# Patient Record
Sex: MALE | Race: WHITE | Employment: FULL TIME | URBAN - METROPOLITAN AREA
[De-identification: names, ages, dates, MRNs, and addresses within clinical notes are randomized per-mention and may not be internally consistent; named-entity substitution may affect disease eponyms.]

---

## 2020-02-19 ENCOUNTER — APPOINTMENT (OUTPATIENT)
Dept: GENERAL RADIOLOGY | Facility: HOSPITAL | Age: 60
End: 2020-02-19
Attending: NURSE PRACTITIONER
Payer: COMMERCIAL

## 2020-02-19 ENCOUNTER — HOSPITAL ENCOUNTER (EMERGENCY)
Facility: HOSPITAL | Age: 60
Discharge: HOME | End: 2020-02-19
Attending: EMERGENCY MEDICINE
Payer: COMMERCIAL

## 2020-02-19 ENCOUNTER — APPOINTMENT (OUTPATIENT)
Dept: ULTRASOUND IMAGING | Facility: HOSPITAL | Age: 60
End: 2020-02-19
Attending: NURSE PRACTITIONER
Payer: COMMERCIAL

## 2020-02-19 VITALS
HEART RATE: 72 BPM | SYSTOLIC BLOOD PRESSURE: 110 MMHG | WEIGHT: 300 LBS | OXYGEN SATURATION: 95 % | HEIGHT: 69 IN | DIASTOLIC BLOOD PRESSURE: 64 MMHG | RESPIRATION RATE: 16 BRPM | TEMPERATURE: 98 F | BODY MASS INDEX: 44.43 KG/M2

## 2020-02-19 DIAGNOSIS — L97.529 ULCER OF LEFT FOOT, UNSPECIFIED ULCER STAGE (HCC): Primary | ICD-10-CM

## 2020-02-19 DIAGNOSIS — R06.02 EXERTIONAL SHORTNESS OF BREATH: ICD-10-CM

## 2020-02-19 LAB
ALBUMIN SERPL-MCNC: 2.7 G/DL (ref 3.4–5)
ALBUMIN/GLOB SERPL: 0.7 {RATIO} (ref 1–2)
ALP LIVER SERPL-CCNC: 96 U/L (ref 45–117)
ALT SERPL-CCNC: 22 U/L (ref 16–61)
ANION GAP SERPL CALC-SCNC: 7 MMOL/L (ref 0–18)
AST SERPL-CCNC: 27 U/L (ref 15–37)
BASOPHILS # BLD AUTO: 0.05 X10(3) UL (ref 0–0.2)
BASOPHILS NFR BLD AUTO: 0.7 %
BILIRUB SERPL-MCNC: 0.5 MG/DL (ref 0.1–2)
BUN BLD-MCNC: 21 MG/DL (ref 7–18)
BUN/CREAT SERPL: 29.2 (ref 10–20)
CALCIUM BLD-MCNC: 8 MG/DL (ref 8.5–10.1)
CHLORIDE SERPL-SCNC: 101 MMOL/L (ref 98–112)
CO2 SERPL-SCNC: 27 MMOL/L (ref 21–32)
CREAT BLD-MCNC: 0.72 MG/DL (ref 0.7–1.3)
DEPRECATED RDW RBC AUTO: 46.2 FL (ref 35.1–46.3)
EOSINOPHIL # BLD AUTO: 0.14 X10(3) UL (ref 0–0.7)
EOSINOPHIL NFR BLD AUTO: 1.9 %
ERYTHROCYTE [DISTWIDTH] IN BLOOD BY AUTOMATED COUNT: 13.5 % (ref 11–15)
GLOBULIN PLAS-MCNC: 4.1 G/DL (ref 2.8–4.4)
GLUCOSE BLD-MCNC: 93 MG/DL (ref 70–99)
HCT VFR BLD AUTO: 42.3 % (ref 39–53)
HGB BLD-MCNC: 13.6 G/DL (ref 13–17.5)
IMM GRANULOCYTES # BLD AUTO: 0.03 X10(3) UL (ref 0–1)
IMM GRANULOCYTES NFR BLD: 0.4 %
LYMPHOCYTES # BLD AUTO: 1.07 X10(3) UL (ref 1–4)
LYMPHOCYTES NFR BLD AUTO: 14.6 %
M PROTEIN MFR SERPL ELPH: 6.8 G/DL (ref 6.4–8.2)
MCH RBC QN AUTO: 29.4 PG (ref 26–34)
MCHC RBC AUTO-ENTMCNC: 32.2 G/DL (ref 31–37)
MCV RBC AUTO: 91.6 FL (ref 80–100)
MONOCYTES # BLD AUTO: 1.37 X10(3) UL (ref 0.1–1)
MONOCYTES NFR BLD AUTO: 18.7 %
NEUTROPHILS # BLD AUTO: 4.67 X10 (3) UL (ref 1.5–7.7)
NEUTROPHILS # BLD AUTO: 4.67 X10(3) UL (ref 1.5–7.7)
NEUTROPHILS NFR BLD AUTO: 63.7 %
NT-PROBNP SERPL-MCNC: 1137 PG/ML (ref ?–125)
OSMOLALITY SERPL CALC.SUM OF ELEC: 283 MOSM/KG (ref 275–295)
PLATELET # BLD AUTO: 196 10(3)UL (ref 150–450)
POTASSIUM SERPL-SCNC: 4.1 MMOL/L (ref 3.5–5.1)
RBC # BLD AUTO: 4.62 X10(6)UL (ref 4.3–5.7)
SODIUM SERPL-SCNC: 135 MMOL/L (ref 136–145)
TROPONIN I SERPL-MCNC: <0.045 NG/ML (ref ?–0.04)
WBC # BLD AUTO: 7.3 X10(3) UL (ref 4–11)

## 2020-02-19 PROCEDURE — 93005 ELECTROCARDIOGRAM TRACING: CPT

## 2020-02-19 PROCEDURE — 73630 X-RAY EXAM OF FOOT: CPT | Performed by: NURSE PRACTITIONER

## 2020-02-19 PROCEDURE — 87186 SC STD MICRODIL/AGAR DIL: CPT | Performed by: NURSE PRACTITIONER

## 2020-02-19 PROCEDURE — 93010 ELECTROCARDIOGRAM REPORT: CPT

## 2020-02-19 PROCEDURE — 96365 THER/PROPH/DIAG IV INF INIT: CPT

## 2020-02-19 PROCEDURE — 83880 ASSAY OF NATRIURETIC PEPTIDE: CPT | Performed by: NURSE PRACTITIONER

## 2020-02-19 PROCEDURE — 36415 COLL VENOUS BLD VENIPUNCTURE: CPT

## 2020-02-19 PROCEDURE — 87040 BLOOD CULTURE FOR BACTERIA: CPT | Performed by: NURSE PRACTITIONER

## 2020-02-19 PROCEDURE — 87205 SMEAR GRAM STAIN: CPT | Performed by: NURSE PRACTITIONER

## 2020-02-19 PROCEDURE — 84484 ASSAY OF TROPONIN QUANT: CPT | Performed by: NURSE PRACTITIONER

## 2020-02-19 PROCEDURE — 71045 X-RAY EXAM CHEST 1 VIEW: CPT | Performed by: NURSE PRACTITIONER

## 2020-02-19 PROCEDURE — 96366 THER/PROPH/DIAG IV INF ADDON: CPT

## 2020-02-19 PROCEDURE — 93970 EXTREMITY STUDY: CPT | Performed by: NURSE PRACTITIONER

## 2020-02-19 PROCEDURE — 99285 EMERGENCY DEPT VISIT HI MDM: CPT

## 2020-02-19 PROCEDURE — 87077 CULTURE AEROBIC IDENTIFY: CPT | Performed by: NURSE PRACTITIONER

## 2020-02-19 PROCEDURE — 85025 COMPLETE CBC W/AUTO DIFF WBC: CPT | Performed by: NURSE PRACTITIONER

## 2020-02-19 PROCEDURE — 87070 CULTURE OTHR SPECIMN AEROBIC: CPT | Performed by: NURSE PRACTITIONER

## 2020-02-19 PROCEDURE — 80053 COMPREHEN METABOLIC PANEL: CPT | Performed by: NURSE PRACTITIONER

## 2020-02-19 RX ORDER — AMOXICILLIN AND CLAVULANATE POTASSIUM 875; 125 MG/1; MG/1
1 TABLET, FILM COATED ORAL 2 TIMES DAILY
Qty: 20 TABLET | Refills: 0 | Status: SHIPPED | OUTPATIENT
Start: 2020-02-19 | End: 2020-02-29

## 2020-02-19 NOTE — ED INITIAL ASSESSMENT (HPI)
Patient reports wound to bottom of left foot x several days. Admits to foul odor to drainage. Denies fever/chills.

## 2020-02-19 NOTE — ED PROVIDER NOTES
Patient Seen in: [facility] Emergency Department      History   Patient presents with:  Abscess    Stated Complaint: wound to bottom of left foot    HPI  65 yo male with no medical history presents with worsening left foot ulcer with pain for the pa Musculoskeletal: Normal range of motion and neck supple. Cardiovascular:      Rate and Rhythm: Normal rate and regular rhythm. Heart sounds: Normal heart sounds.    Pulmonary:      Effort: Pulmonary effort is normal.      Breath sounds: Normal maribel BLOOD CULTURE   AEROBIC BACTERIAL CULTURE     EKG    Rate, intervals and axes as noted on EKG Report.   Rate: 73  Rhythm: Sinus Rhythm  Reading: Normal sinus rhythm  Left axis deviation  Possible Lateral infarct (cited on or before 22-OCT-2007)  Abnormal EC PROCEDURE:  US VENOUS DOPPLER LEG BILAT - DIAG IMG (CPT=93970)  COMPARISON:  US CHANTAL, US VENOUS DOPPLER LEG RIGHT - DIAG IMG (CPT=93971), 9/06/2016, 12:42.   INDICATIONS:  eval for DVT  TECHNIQUE:  Real time, grey scale, and duplex ultrasound was us EKG with no acute changes. CBC with no elevated WBC. CMP wnl. Troponin - with mildly elevated BNP. US - for DVT. Foot xray with no obvious osteo. CXR wnl. Pt appears to have foot ulcer of unknown length of time.   Culture was sent from wound and [facility]

## 2020-02-20 LAB
ATRIAL RATE: 73 BPM
P AXIS: -7 DEGREES
P-R INTERVAL: 136 MS
Q-T INTERVAL: 400 MS
QRS DURATION: 108 MS
QTC CALCULATION (BEZET): 440 MS
R AXIS: -41 DEGREES
T AXIS: 21 DEGREES
VENTRICULAR RATE: 73 BPM

## 2020-02-27 ENCOUNTER — OFFICE VISIT (OUTPATIENT)
Dept: WOUND CARE | Facility: HOSPITAL | Age: 60
End: 2020-02-27
Attending: FAMILY MEDICINE
Payer: COMMERCIAL

## 2020-02-27 DIAGNOSIS — S91.302A UNSPECIFIED OPEN WOUND, LEFT FOOT, INITIAL ENCOUNTER: Primary | ICD-10-CM

## 2020-02-27 PROCEDURE — 99214 OFFICE O/P EST MOD 30 MIN: CPT

## 2020-02-28 NOTE — PROGRESS NOTES
Print   x Close    Header Image    CurrentOld Version  Progress Note Details  Patient Name: Ada Lewis  Patient Number: [id number]  Patient YOB: 1960  Date: 2/27/2020  Physician / Janice Muhammad: Amparo Craft: Belén [id number] 2/27/2020    Medications  Augmentin - oral 875 mg-125 mg 1 tablet twice daily        Objective    Wound Assessment(s)  Wound #1 Left, Plantar Foot is an acute Full Thickness Trauma Wound and has received a status of Bridged.  Initial wound encounter measure and conditions:  Extremity Color: Pigmented  Hair Growth on Extremity: Yes  Temperature of Extremity: Warm  Capillary Refill: < 3 seconds  Erythema: No  Dependent Rubor: No  Hyperpigmentation: Yes    General Notes:  Length from heel to back of knee 18 inch

## 2020-03-05 ENCOUNTER — OFFICE VISIT (OUTPATIENT)
Dept: WOUND CARE | Facility: HOSPITAL | Age: 60
End: 2020-03-05
Attending: FAMILY MEDICINE
Payer: COMMERCIAL

## 2020-03-05 DIAGNOSIS — S91.302A UNSPECIFIED OPEN WOUND, LEFT FOOT, INITIAL ENCOUNTER: Primary | ICD-10-CM

## 2020-03-05 PROCEDURE — 99213 OFFICE O/P EST LOW 20 MIN: CPT

## 2020-03-05 NOTE — PROGRESS NOTES
Print   x Close    Header Image    CurrentOld Version  Progress Note Details  Patient Name: Patti Mora  Patient Number: [id number]  Patient YOB: 1960  Date: 3/5/2020  Physician / Aaron Flores: Oscar Hay: Edmond Montoya 0.1cm depth, with an area of 10.44 sq cm and a volume of 1.044 cubic cm. No tunneling has been noted. No sinus tract has been noted. No undermining has been noted. There is a small amount of sero-sanguineous drainage noted which has no odor.  The patient re

## 2020-03-12 ENCOUNTER — OFFICE VISIT (OUTPATIENT)
Dept: WOUND CARE | Facility: HOSPITAL | Age: 60
End: 2020-03-12
Attending: FAMILY MEDICINE
Payer: COMMERCIAL

## 2020-03-12 DIAGNOSIS — S91.302A UNSPECIFIED OPEN WOUND, LEFT FOOT, INITIAL ENCOUNTER: Primary | ICD-10-CM

## 2020-03-12 PROCEDURE — 99213 OFFICE O/P EST LOW 20 MIN: CPT

## 2020-03-12 NOTE — PROGRESS NOTES
Print   x Close    Header Image    CurrentOld Version  Progress Note Details  Patient Name: Jose Miguel Dickens  Patient Number: [id number]  Patient YOB: 1960  Date: 3/12/2020  Physician / Tramaine Saunders: Evin Calle: Francene Lesch of Bridged. Subsequent wound encounter measurements are 1.6cm length x 5.4cm width x 0.1cm depth, with an area of 8.64 sq cm and a volume of 0.864 cubic cm. No tunneling has been noted. No sinus tract has been noted. No undermining has been noted.  There is help offload force on the foot. F/u in 1 week.      By signing below, Rico Lin, attest that this documentation has been prepared under the direction and in the presence of Hussein Stacy MD. 3/12/20 2:39 PM     Hussein SWEENEY M.D., have person

## 2020-03-19 ENCOUNTER — OFFICE VISIT (OUTPATIENT)
Dept: WOUND CARE | Facility: HOSPITAL | Age: 60
End: 2020-03-19
Attending: FAMILY MEDICINE
Payer: COMMERCIAL

## 2020-03-19 DIAGNOSIS — S91.302A UNSPECIFIED OPEN WOUND, LEFT FOOT, INITIAL ENCOUNTER: Primary | ICD-10-CM

## 2020-03-19 PROCEDURE — 99214 OFFICE O/P EST MOD 30 MIN: CPT

## 2020-03-19 NOTE — PROGRESS NOTES
Print   x Close    Header Image    Progress Note Details  Patient Name: Araseli Suarez  Patient Number: [id number]  Patient YOB: 1960  Date: 3/19/2020  Physician / Lu Mchugh: Antonio Davidson: Jodie Romo    Chief Complaint  This restriction? : No  Patient taking supplements? : No    Additional Information  Medication reconciliation completed at today's visit. : Yes        Objective    Wound Assessment(s)  Wound #1 Left, Plantar Foot is an acute Full Thickness Trauma Wound and has free of varicosities, clubbing or edema. Capillary refill < 3 seconds. Digits are warm. toenails are wnl for color, thickness and hygeine. + hairgrowth on legs and feet. Guadalupe Riedel     Physical Exam Notes  severe varicosities noted as previous visits on left lower le

## 2020-04-09 ENCOUNTER — APPOINTMENT (OUTPATIENT)
Dept: WOUND CARE | Facility: HOSPITAL | Age: 60
End: 2020-04-09
Attending: FAMILY MEDICINE
Payer: COMMERCIAL

## 2020-04-21 ENCOUNTER — OFFICE VISIT (OUTPATIENT)
Dept: WOUND CARE | Facility: HOSPITAL | Age: 60
End: 2020-04-21
Attending: FAMILY MEDICINE
Payer: COMMERCIAL

## 2020-04-21 DIAGNOSIS — S91.302A UNSPECIFIED OPEN WOUND, LEFT FOOT, INITIAL ENCOUNTER: Primary | ICD-10-CM

## 2020-04-21 PROCEDURE — 99214 OFFICE O/P EST MOD 30 MIN: CPT

## 2020-04-23 NOTE — PROGRESS NOTES
Print   x Close    Header Image    Progress Note Details  Patient Name: Hugh Rubio  Patient Number: [id number]  Patient YOB: 1960  Date: 4/21/2020  Physician / Madison Byrd: Xavier Perales: Elsie 46    Chief Complaint  This Yes  4 servings protein daily?: Yes  5 servings fruit/veg daily? : Yes  8 – 10 cups water daily if not on a fluid restriction? : No  Patient taking supplements? : No    Additional Information  Medication reconciliation completed at today's visit. : Yes Notes  severe varicosities noted as previous visits on left lower leg, ankle. 3-4mm undermining noted from 4-12 oclock. wound edges and wound base were stimulated to bleeding with curette.         Assessment    Active Problems    ICD-10  (Encounter Diagnosi

## 2020-04-28 ENCOUNTER — APPOINTMENT (OUTPATIENT)
Dept: WOUND CARE | Facility: HOSPITAL | Age: 60
End: 2020-04-28
Attending: FAMILY MEDICINE
Payer: COMMERCIAL

## 2020-05-05 ENCOUNTER — OFFICE VISIT (OUTPATIENT)
Dept: WOUND CARE | Facility: HOSPITAL | Age: 60
End: 2020-05-05
Attending: FAMILY MEDICINE
Payer: COMMERCIAL

## 2020-05-05 DIAGNOSIS — S91.302A UNSPECIFIED OPEN WOUND, LEFT FOOT, INITIAL ENCOUNTER: Primary | ICD-10-CM

## 2020-05-05 PROCEDURE — 99214 OFFICE O/P EST MOD 30 MIN: CPT

## 2020-05-05 NOTE — PROGRESS NOTES
Print   x Close    Header Image    Progress Note Details  Patient Name: Yoni Ren  Patient Number: [id number]  Patient YOB: 1960  Date: 5/5/2020  Physician / Cody Fonseca: Kenyetta Police: Dwaine Schuler    Chief Complaint  This i Pressure: 104/73 mmHg. Assessment          Plan  I recommend he try a knee roller to completely offload on the left foot, information given. Also at next visit, if not improved, will order MRI of foot to r/o osteomyelitis and CBC, CMP, HGA1C.  Pt has

## 2020-05-12 ENCOUNTER — HOSPITAL ENCOUNTER (OUTPATIENT)
Dept: LAB | Facility: HOSPITAL | Age: 60
Discharge: HOME | End: 2020-05-12
Attending: FAMILY MEDICINE
Payer: COMMERCIAL

## 2020-05-12 ENCOUNTER — OFFICE VISIT (OUTPATIENT)
Dept: WOUND CARE | Facility: HOSPITAL | Age: 60
End: 2020-05-12
Attending: FAMILY MEDICINE
Payer: COMMERCIAL

## 2020-05-12 ENCOUNTER — ORDER TRANSCRIPTION (OUTPATIENT)
Dept: WOUND CARE | Facility: HOSPITAL | Age: 60
End: 2020-05-12

## 2020-05-12 DIAGNOSIS — S91.302A UNSPECIFIED OPEN WOUND, LEFT FOOT, INITIAL ENCOUNTER: Primary | ICD-10-CM

## 2020-05-12 DIAGNOSIS — S91.302D UNSPECIFIED OPEN WOUND, LEFT FOOT, SUBSEQUENT ENCOUNTER: ICD-10-CM

## 2020-05-12 DIAGNOSIS — S91.302D UNSPECIFIED OPEN WOUND, LEFT FOOT, SUBSEQUENT ENCOUNTER: Primary | ICD-10-CM

## 2020-05-12 PROCEDURE — 99213 OFFICE O/P EST LOW 20 MIN: CPT

## 2020-05-12 PROCEDURE — 80053 COMPREHEN METABOLIC PANEL: CPT

## 2020-05-12 PROCEDURE — 36415 COLL VENOUS BLD VENIPUNCTURE: CPT

## 2020-05-12 PROCEDURE — 83036 HEMOGLOBIN GLYCOSYLATED A1C: CPT

## 2020-05-12 NOTE — PROGRESS NOTES
Print   x Close    Header Image    Progress Note Details  Patient Name: Jose Miguel Dickens  Patient Number: [id number]  Patient YOB: 1960  Date: 5/12/2020  Physician / Tramaine Saunders: Evin Calle: Elsie Lagunas    Chief Complaint  This Health)  Eyes  Ear/Nose/Mouth/Throat  Respiratory  Cardiovascular (Central/Peripheral)  Gastrointestinal (GI)  Genitourinary ()  Musculoskeletal  Neurological  Endocrine  Hematologic/Lymphatic  Allergic/Immunologic  Psychiatric    Weekly Nutrition Assess wnl. Weight normal for height. . Appearance neat and clean. Appears in no acute distress. Well nourished and well developed. Respiratory:  Respiratory effort is easy and symmetric bilaterally. Rate is normal at rest and on room air .  Chest Percussion celio

## 2020-05-19 ENCOUNTER — OFFICE VISIT (OUTPATIENT)
Dept: WOUND CARE | Facility: HOSPITAL | Age: 60
End: 2020-05-19
Attending: FAMILY MEDICINE
Payer: COMMERCIAL

## 2020-05-19 DIAGNOSIS — S91.302D UNSPECIFIED OPEN WOUND, LEFT FOOT, SUBSEQUENT ENCOUNTER: Primary | ICD-10-CM

## 2020-05-19 DIAGNOSIS — S91.302A UNSPECIFIED OPEN WOUND, LEFT FOOT, INITIAL ENCOUNTER: ICD-10-CM

## 2020-05-19 PROCEDURE — 99213 OFFICE O/P EST LOW 20 MIN: CPT

## 2020-05-19 NOTE — PROGRESS NOTES
Print   x Close    Header Image    Progress Note Details  Patient Name: Marielos Zacarias  Patient Number: [id number]  Patient YOB: 1960  Date: 5/19/2020  Physician / Isidoro Gomez: Saloni Shi: Elsie 46    Chief Complaint  This wounds. Pt requested a note for work.     Review of Systems (ROS)  This information was obtained from the patient    Complaints and Symptoms  Patient denies complaints or symptoms related to:  Constitutional Symptoms (General Health)  Eyes  Ear/Nose/Mouth/T 103/68 mmHg. Physical Exam  Constitutional  bp wnl for patient. Pulse Regular and wnl for patient. Jarvis [place] Respirations easy and unlabored. Temperature wnl. Weight normal for height. . Appearance neat and clean. Appears in no acute distress.  Well nourished and

## 2020-06-02 ENCOUNTER — OFFICE VISIT (OUTPATIENT)
Dept: WOUND CARE | Facility: HOSPITAL | Age: 60
End: 2020-06-02
Attending: FAMILY MEDICINE
Payer: COMMERCIAL

## 2020-06-02 DIAGNOSIS — S91.302A UNSPECIFIED OPEN WOUND, LEFT FOOT, INITIAL ENCOUNTER: ICD-10-CM

## 2020-06-02 DIAGNOSIS — S91.302D UNSPECIFIED OPEN WOUND, LEFT FOOT, SUBSEQUENT ENCOUNTER: Primary | ICD-10-CM

## 2020-06-02 PROCEDURE — 87070 CULTURE OTHR SPECIMN AEROBIC: CPT

## 2020-06-02 PROCEDURE — 87077 CULTURE AEROBIC IDENTIFY: CPT

## 2020-06-02 PROCEDURE — 87205 SMEAR GRAM STAIN: CPT

## 2020-06-02 PROCEDURE — 87186 SC STD MICRODIL/AGAR DIL: CPT

## 2020-06-02 PROCEDURE — 99214 OFFICE O/P EST MOD 30 MIN: CPT

## 2020-06-02 NOTE — PROGRESS NOTES
Print   x Close    Header Image    Progress Note Details  Patient Name: Nilsa Kelley  Patient Number: [id number]  Patient YOB: 1960  Date: 6/2/2020  Physician / Tim Nava: Rosy Jorge: Cassi Nails    Chief Complaint  This i 2/27/20; Pt is a 64year old male who presents for foot ulcer on the left plantar foot. Pt is unsure about how the wound developed. Pt denies trauma or blisters. Pt is currently on Augmentin and applying TOA topical. Pt dresses his own wounds.  Pt reque The temperature of the periwound skin is Warm. Periwound skin does not exhibit signs or symptoms of infection. Local Pulse is Normal.    Wound #2 Left, Lateral Foot is a Full Thickness Trauma Wound and has received a status of Not Healed.  Initial wound enc warm. toenails are wnl for color, thickness and hygeine. + hairgrowth on legs and feet. Carlton Chambers Physical Exam Notes  clear yellow drainage noted from wound lateral left foot with some swelling and erythema, and fluctuance of periwound.         Assessment    Act

## 2020-06-09 ENCOUNTER — APPOINTMENT (OUTPATIENT)
Dept: WOUND CARE | Facility: HOSPITAL | Age: 60
End: 2020-06-09
Attending: FAMILY MEDICINE
Payer: COMMERCIAL

## 2020-06-11 ENCOUNTER — OFFICE VISIT (OUTPATIENT)
Dept: WOUND CARE | Facility: HOSPITAL | Age: 60
End: 2020-06-11
Attending: FAMILY MEDICINE
Payer: COMMERCIAL

## 2020-06-11 DIAGNOSIS — S91.302D UNSPECIFIED OPEN WOUND, LEFT FOOT, SUBSEQUENT ENCOUNTER: Primary | ICD-10-CM

## 2020-06-11 DIAGNOSIS — S91.302A UNSPECIFIED OPEN WOUND, LEFT FOOT, INITIAL ENCOUNTER: ICD-10-CM

## 2020-06-11 PROCEDURE — 99214 OFFICE O/P EST MOD 30 MIN: CPT

## 2020-06-11 NOTE — PROGRESS NOTES
Print   x Close    Header Image    Progress Note Details  Patient Name: Radha Bobo  Patient Number: [id number]  Patient YOB: 1960  Date: 6/11/2020  Physician / Annita Wiggins: Roxann Downey: Shagufta Most    Chief Complaint  This foot. Wound was treated with endoform. Pt notes that he has cut back on smoking to about a half a pack a day. 2/27/20; Pt is a 64year old male who presents for foot ulcer on the left plantar foot. Pt is unsure about how the wound developed.  Pt denies skin did not exhibit: Moist, Maceration. The temperature of the periwound skin is Warm. Periwound skin does not exhibit signs or symptoms of infection.  Local Pulse is Normal.  General Notes:  deepest undermining at 5 and unable to visualize the wound bed < 3 seconds. Digits are warm. toenails are wnl for color, thickness and hygeine. + hairgrowth on legs and feet. .        Assessment    Active Problems    ICD-10  (Encounter Diagnosis) S91.302D - Unspecified open wound, left foot, subsequent encounter    Vandana

## 2020-06-18 ENCOUNTER — APPOINTMENT (OUTPATIENT)
Dept: WOUND CARE | Facility: HOSPITAL | Age: 60
End: 2020-06-18
Attending: FAMILY MEDICINE
Payer: COMMERCIAL

## 2020-06-23 ENCOUNTER — OFFICE VISIT (OUTPATIENT)
Dept: WOUND CARE | Facility: HOSPITAL | Age: 60
End: 2020-06-23
Attending: FAMILY MEDICINE
Payer: COMMERCIAL

## 2020-06-23 DIAGNOSIS — S91.302D UNSPECIFIED OPEN WOUND, LEFT FOOT, SUBSEQUENT ENCOUNTER: Primary | ICD-10-CM

## 2020-06-23 PROCEDURE — 99213 OFFICE O/P EST LOW 20 MIN: CPT

## 2020-06-23 NOTE — PROGRESS NOTES
Print   x Close    Header Image    Progress Note Details  Patient Name: Brian Holloway  Patient Number: [id number]  Patient YOB: 1960  Date: 6/23/2020  Physician / Blair King: Peña Coil: Elsie 46    Chief Complaint  This for extension. 3/12/20: f/u for foot ulcer on the left plantar foot. Wound was being treated with endoform and hydrofera ready. 3/5/20: f/u for 2 foot ulcers on the left plantar foot. Wound was treated with endoform.  Pt notes that he has cut back on small amount of sero-sanguineous drainage noted which has no odor. The patient reports a wound pain of level 3/10. The wound margin is callus. Wound bed has % pink, spongy granulation. The periwound skin exhibited: Edema, Callus, Dry/Scaly.  The arleth Orders:  Wound #1 Left, Plantar Foot    Wound Cleansing & Dressings:  Haydee Collagen  Bordered gauze  Change Dressing Every: - 2-3 days    Off-Loading:  Darco shoe with peg insert    Follow-Up Appointments:  Return Appointment in 2 weeks.     Follow-Up Jayce

## 2020-07-07 ENCOUNTER — OFFICE VISIT (OUTPATIENT)
Dept: WOUND CARE | Facility: HOSPITAL | Age: 60
End: 2020-07-07
Attending: FAMILY MEDICINE
Payer: COMMERCIAL

## 2020-07-07 DIAGNOSIS — S91.302D UNSPECIFIED OPEN WOUND, LEFT FOOT, SUBSEQUENT ENCOUNTER: Primary | ICD-10-CM

## 2020-07-07 PROCEDURE — 99213 OFFICE O/P EST LOW 20 MIN: CPT

## 2020-07-07 NOTE — PROGRESS NOTES
Print   x Close    Header Image    Progress Note Details  Patient Name: Ankush Scott  Patient Number: [id number]  Patient YOB: 1960  Date: 7/7/2020  Physician / Gisselle Foster: Temi Cook: Gianluca Bullocks    Chief Complaint  This i forms previously filled out by Dr. Jarrod quintanilla until the end of March. Pt requests note for extension. 3/12/20: f/u for foot ulcer on the left plantar foot. Wound was being treated with endoform and hydrofera ready.     3/5/20: f/u for 2 foot ulcers on t of 0.5cm. There is a small amount of sero-sanguineous drainage noted which has no odor. The patient reports a wound pain of level 3/10. The wound margin is callus. The periwound skin exhibited: Edema, Callus, Dry/Scaly.  The periwound skin did not exhibit: left foot, subsequent encounter    Diagnoses    ICD-10  S91.302D: Unspecified open wound, left foot, subsequent encounter        Plan    Wound Orders:  Wound #1 Left, Plantar Foot    Wound Cleansing & Dressings:  Hydrofera Ready  Dry Gauze  Paper tape  Kaye

## 2020-07-14 ENCOUNTER — OFFICE VISIT (OUTPATIENT)
Dept: WOUND CARE | Facility: HOSPITAL | Age: 60
End: 2020-07-14
Attending: FAMILY MEDICINE
Payer: COMMERCIAL

## 2020-07-14 DIAGNOSIS — S91.302D UNSPECIFIED OPEN WOUND, LEFT FOOT, SUBSEQUENT ENCOUNTER: Primary | ICD-10-CM

## 2020-07-14 PROCEDURE — 99214 OFFICE O/P EST MOD 30 MIN: CPT

## 2020-07-15 NOTE — PROGRESS NOTES
Print   x Close    Header Image    Progress Note Details  Patient Name: Araseli Suarez  Patient Number: [id number]  Patient YOB: 1960  Date: 7/14/2020  Physician / Lu Mchugh: Antonio Davidson: Elsie 46    Chief Complaint  This f/u of foot ulcer left plantar, doing well, does his own dressing changes with a mirror. He is currently off work on short term disability, forms previously filled out by Dr. Matt quintanilla until the end of March. Pt requests note for extension.     3/12/20: f 0.2cm width x 1cm depth, with an area of 0.02 sq cm and a volume of 0.02 cubic cm. No tunneling has been noted. No sinus tract has been noted. Undermining has been noted at 12:00 and ends at 12:00 with a maximum distance of 0.5cm.  There is a scant amount o Abdomen is soft and non-distended without tenderness. Bowel sounds active. No liver or spleen enlargement or tenderness.         Assessment    Active Problems    ICD-10  (Encounter Diagnosis) S91.302D - Unspecified open wound, left foot, subsequent encounte

## 2020-07-21 ENCOUNTER — OFFICE VISIT (OUTPATIENT)
Dept: WOUND CARE | Facility: HOSPITAL | Age: 60
End: 2020-07-21
Attending: FAMILY MEDICINE
Payer: COMMERCIAL

## 2020-07-21 DIAGNOSIS — S91.302D UNSPECIFIED OPEN WOUND, LEFT FOOT, SUBSEQUENT ENCOUNTER: Primary | ICD-10-CM

## 2020-07-21 PROCEDURE — 99214 OFFICE O/P EST MOD 30 MIN: CPT

## 2020-07-21 NOTE — PROGRESS NOTES
Print   x Close    Header Image    Progress Note Details  Patient Name: Kannan Yun  Patient Number: [id number]  Patient YOB: 1960  Date: 7/21/2020  Physician / Gen Rodriguez: Fidel Rico: Elsie 46    Chief Complaint  This peg.    5/5/20: f/u of left foot plantar wound. Pt states he has been doing a good job offloading with darco shoe. He mainly walks just to get outside his house on the patio. No fever, pain minimal.    4/21/20: f/u of left foot ulcer plantar.  Doing fine, n patient recieve enteral feedings?: No    Additional Information  Medication reconciliation completed at today's visit. : Yes        Objective    Wound Assessment(s)  Wound #1 Left, Plantar Foot is an acute Full Thickness Trauma Wound and has received a sta Femoral arteries without bruits and pulses strong. dp/pt palpable bilaterally. Extremities free of varicosities, clubbing or edema. Capillary refill < 3 seconds. Digits are warm. toenails are wnl for color, thickness and hygeine.  + hairgrowth on legs and f

## 2020-07-28 ENCOUNTER — OFFICE VISIT (OUTPATIENT)
Dept: WOUND CARE | Facility: HOSPITAL | Age: 60
End: 2020-07-28
Attending: FAMILY MEDICINE
Payer: COMMERCIAL

## 2020-07-28 ENCOUNTER — HOSPITAL ENCOUNTER (OUTPATIENT)
Dept: GENERAL RADIOLOGY | Facility: HOSPITAL | Age: 60
Discharge: HOME | End: 2020-07-28
Attending: FAMILY MEDICINE
Payer: COMMERCIAL

## 2020-07-28 DIAGNOSIS — S91.302D UNSPECIFIED OPEN WOUND, LEFT FOOT, SUBSEQUENT ENCOUNTER: Primary | ICD-10-CM

## 2020-07-28 DIAGNOSIS — S91.302D WOUND, OPEN, FOOT WITH COMPLICATION, LEFT, SUBSEQUENT ENCOUNTER: ICD-10-CM

## 2020-07-28 PROCEDURE — 87070 CULTURE OTHR SPECIMN AEROBIC: CPT

## 2020-07-28 PROCEDURE — 73630 X-RAY EXAM OF FOOT: CPT | Performed by: FAMILY MEDICINE

## 2020-07-28 PROCEDURE — 87205 SMEAR GRAM STAIN: CPT

## 2020-07-28 PROCEDURE — 99214 OFFICE O/P EST MOD 30 MIN: CPT

## 2020-07-28 PROCEDURE — 87186 SC STD MICRODIL/AGAR DIL: CPT

## 2020-07-28 PROCEDURE — 87147 CULTURE TYPE IMMUNOLOGIC: CPT

## 2020-07-28 PROCEDURE — 87077 CULTURE AEROBIC IDENTIFY: CPT

## 2020-07-28 NOTE — PROGRESS NOTES
Print   x Close    Header Image    Progress Note Details  Patient Name: Dain Aldrich  Patient Number: [id number]  Patient YOB: 1960  Date: 7/28/2020  Physician / William Salvage: Faye [place]: Elsie Lagunas    Chief Complaint  This appears to look better, no new complaints. could not afford knee roller. wearing darco with peg.    5/5/20: f/u of left foot plantar wound. Pt states he has been doing a good job offloading with darco shoe.  He mainly walks just to get outside his house on on prescription diet?: Low Sodium  Patient taking supplements? : Protein supplement  Unintentional weight gain or loss > 5% / month?: No  Does the patient recieve enteral feedings?: No    Additional Information  Medication reconciliation completed at today Local Pulse is Normal.  General Notes:  Deepest undermining at 6 oclock    Vitals  Height/Length: 69 in (175.26 cm), Weight: 300 lbs (136.36 kgs), BMI: 44.3, Pulse: 85 bpm, Respiratory Rate: 16 breaths/min, Blood Pressure: 100/67 mmHg.   Vital Signs Notes: today.    Off-Loading:  Darco shoe with peg insert - OFFLOAD THE LEFT FOOT AS MUCH AS POSSIBLE    Follow-Up Appointments:  Return Appointment in 1 week.     Wound #3 Left, Lateral Foot    Wound Cleansing & Dressings:  Silver alginate - gauze, tape, and bacon

## 2020-08-04 ENCOUNTER — OFFICE VISIT (OUTPATIENT)
Dept: WOUND CARE | Facility: HOSPITAL | Age: 60
End: 2020-08-04
Attending: FAMILY MEDICINE
Payer: COMMERCIAL

## 2020-08-04 DIAGNOSIS — L08.9 LOCAL INFECTION OF THE SKIN AND SUBCUTANEOUS TISSUE, UNSPECIFIED: ICD-10-CM

## 2020-08-04 DIAGNOSIS — S91.302D UNSPECIFIED OPEN WOUND, LEFT FOOT, SUBSEQUENT ENCOUNTER: Primary | ICD-10-CM

## 2020-08-04 PROCEDURE — 99214 OFFICE O/P EST MOD 30 MIN: CPT

## 2020-08-04 NOTE — PROGRESS NOTES
Print   x Close    Header Image    Progress Note Details  Patient Name: Jose Miguel Dickens  Patient Number: [id number]  Patient YOB: 1960  Date: 8/4/2020  Physician / Tramaine Saunders: Evin Calle: Shelby Houser    Chief Complaint  This i some paperwork from [facility] regarding need for further info for MRI.    5/12/20: F/U of left plantar wound over charcot joint. wound appears to look better, no new complaints. could not afford knee roller.  wearing darco with peg.    5/5/20: f/u of dietary intake?: Yes  4 servings protein daily?: Yes  5 servings fruit/veg daily? : Yes  8 – 10 cups water daily if not on a fluid restriction? : Yes  Patient on prescription diet?: Low Sodium  Patient taking supplements? : Protein supplement  Unintentiona periwound skin is Warm. Periwound skin does not exhibit signs or symptoms of infection. Local Pulse is Normal.  General Notes:  Unable to see wound bed. Vitals  Height/Length: 69 in (175.26 cm), Weight: 300 lbs (136.36 kgs), BMI: 44.3, Temperature: 97. 1 alginate  Bordered gauze  Change Dressing Every Other Day and As Needed. Off-Loading:  Darco shoe with peg insert - OFFLOAD THE LEFT FOOT AS MUCH AS POSSIBLE  Other: - Start antibiotics, sent to your pharmacy.     Follow-Up Appointments:  Return Appointm

## 2020-08-11 ENCOUNTER — APPOINTMENT (OUTPATIENT)
Dept: WOUND CARE | Facility: HOSPITAL | Age: 60
End: 2020-08-11
Attending: FAMILY MEDICINE
Payer: COMMERCIAL

## 2020-08-18 ENCOUNTER — APPOINTMENT (OUTPATIENT)
Dept: WOUND CARE | Facility: HOSPITAL | Age: 60
End: 2020-08-18
Attending: FAMILY MEDICINE
Payer: COMMERCIAL

## 2022-04-04 ENCOUNTER — APPOINTMENT (OUTPATIENT)
Dept: ULTRASOUND IMAGING | Facility: HOSPITAL | Age: 62
End: 2022-04-04
Attending: EMERGENCY MEDICINE
Payer: COMMERCIAL

## 2022-04-04 ENCOUNTER — APPOINTMENT (OUTPATIENT)
Dept: GENERAL RADIOLOGY | Facility: HOSPITAL | Age: 62
End: 2022-04-04
Attending: EMERGENCY MEDICINE
Payer: COMMERCIAL

## 2022-04-04 ENCOUNTER — HOSPITAL ENCOUNTER (EMERGENCY)
Facility: HOSPITAL | Age: 62
Discharge: HOME | End: 2022-04-04
Attending: EMERGENCY MEDICINE
Payer: COMMERCIAL

## 2022-04-04 VITALS
WEIGHT: 270 LBS | HEART RATE: 64 BPM | TEMPERATURE: 97 F | OXYGEN SATURATION: 95 % | BODY MASS INDEX: 39.54 KG/M2 | SYSTOLIC BLOOD PRESSURE: 95 MMHG | RESPIRATION RATE: 18 BRPM | DIASTOLIC BLOOD PRESSURE: 71 MMHG | HEIGHT: 69.29 IN

## 2022-04-04 DIAGNOSIS — M54.16 LUMBAR RADICULOPATHY: Primary | ICD-10-CM

## 2022-04-04 LAB
ANION GAP SERPL CALC-SCNC: 4 MMOL/L (ref 0–18)
BASOPHILS # BLD AUTO: 0.07 X10(3) UL (ref 0–0.2)
BASOPHILS NFR BLD AUTO: 0.9 %
BUN BLD-MCNC: 16 MG/DL (ref 7–18)
CALCIUM BLD-MCNC: 8.9 MG/DL (ref 8.5–10.1)
CHLORIDE SERPL-SCNC: 105 MMOL/L (ref 98–112)
CO2 SERPL-SCNC: 29 MMOL/L (ref 21–32)
CREAT BLD-MCNC: 0.8 MG/DL
EOSINOPHIL # BLD AUTO: 0.12 X10(3) UL (ref 0–0.7)
EOSINOPHIL NFR BLD AUTO: 1.5 %
ERYTHROCYTE [DISTWIDTH] IN BLOOD BY AUTOMATED COUNT: 14.5 %
GLUCOSE BLD-MCNC: 88 MG/DL (ref 70–99)
HCT VFR BLD AUTO: 44.1 %
HGB BLD-MCNC: 14.2 G/DL
IMM GRANULOCYTES # BLD AUTO: 0.03 X10(3) UL (ref 0–1)
IMM GRANULOCYTES NFR BLD: 0.4 %
LYMPHOCYTES # BLD AUTO: 0.96 X10(3) UL (ref 1–4)
LYMPHOCYTES NFR BLD AUTO: 12.2 %
MCH RBC QN AUTO: 30 PG (ref 26–34)
MCHC RBC AUTO-ENTMCNC: 32.2 G/DL (ref 31–37)
MCV RBC AUTO: 93.2 FL
MONOCYTES # BLD AUTO: 0.72 X10(3) UL (ref 0.1–1)
MONOCYTES NFR BLD AUTO: 9.1 %
NEUTROPHILS # BLD AUTO: 5.97 X10 (3) UL (ref 1.5–7.7)
NEUTROPHILS # BLD AUTO: 5.97 X10(3) UL (ref 1.5–7.7)
NEUTROPHILS NFR BLD AUTO: 75.9 %
OSMOLALITY SERPL CALC.SUM OF ELEC: 287 MOSM/KG (ref 275–295)
PLATELET # BLD AUTO: 203 10(3)UL (ref 150–450)
POTASSIUM SERPL-SCNC: 4 MMOL/L (ref 3.5–5.1)
RBC # BLD AUTO: 4.73 X10(6)UL
SODIUM SERPL-SCNC: 138 MMOL/L (ref 136–145)
WBC # BLD AUTO: 7.9 X10(3) UL (ref 4–11)

## 2022-04-04 PROCEDURE — 99285 EMERGENCY DEPT VISIT HI MDM: CPT | Performed by: EMERGENCY MEDICINE

## 2022-04-04 PROCEDURE — 85025 COMPLETE CBC W/AUTO DIFF WBC: CPT | Performed by: EMERGENCY MEDICINE

## 2022-04-04 PROCEDURE — 72110 X-RAY EXAM L-2 SPINE 4/>VWS: CPT | Performed by: EMERGENCY MEDICINE

## 2022-04-04 PROCEDURE — 96374 THER/PROPH/DIAG INJ IV PUSH: CPT | Performed by: EMERGENCY MEDICINE

## 2022-04-04 PROCEDURE — 96361 HYDRATE IV INFUSION ADD-ON: CPT | Performed by: EMERGENCY MEDICINE

## 2022-04-04 PROCEDURE — 80048 BASIC METABOLIC PNL TOTAL CA: CPT | Performed by: EMERGENCY MEDICINE

## 2022-04-04 PROCEDURE — 93971 EXTREMITY STUDY: CPT | Performed by: EMERGENCY MEDICINE

## 2022-04-04 PROCEDURE — 99284 EMERGENCY DEPT VISIT MOD MDM: CPT | Performed by: EMERGENCY MEDICINE

## 2022-04-04 RX ORDER — METHYLPREDNISOLONE 4 MG/1
TABLET ORAL
Qty: 1 EACH | Refills: 0 | Status: SHIPPED | OUTPATIENT
Start: 2022-04-04

## 2022-04-04 RX ORDER — SODIUM CHLORIDE 9 MG/ML
INJECTION, SOLUTION INTRAVENOUS CONTINUOUS
Status: DISCONTINUED | OUTPATIENT
Start: 2022-04-04 | End: 2022-04-04

## 2022-04-04 RX ORDER — HYDROCODONE BITARTRATE AND ACETAMINOPHEN 5; 325 MG/1; MG/1
1 TABLET ORAL EVERY 6 HOURS PRN
Qty: 10 TABLET | Refills: 0 | Status: SHIPPED | OUTPATIENT
Start: 2022-04-04 | End: 2022-04-09

## 2022-04-04 RX ORDER — HYDROMORPHONE HYDROCHLORIDE 1 MG/ML
1 INJECTION, SOLUTION INTRAMUSCULAR; INTRAVENOUS; SUBCUTANEOUS ONCE
Status: COMPLETED | OUTPATIENT
Start: 2022-04-04 | End: 2022-04-04

## 2022-04-04 NOTE — ED INITIAL ASSESSMENT (HPI)
Pt c/o of left upper leg pain that radiates to his knee x 2 days. Pt denies recent injuries or falls.

## (undated) NOTE — LETTER
Date & Time: 2/20/2020, 1:58 PM  Patient: Bhavna Crenshaw  Encounter Provider(s):    MD Jeanne Chandler APN       To Whom It May Concern:    Lexis Allen was seen and treated in our department on 2/19/2020.  He may not return to work until [employer]

## (undated) NOTE — ED AVS SNAPSHOT
Luis Martinez   MRN: [id number]    Department:  [facility] Emergency Department   Date of Visit:  2/19/2020           Disclosure     Insurance plans vary and the physician(s) referred by the ER may not be covered by your plan.  Please contact your in tell this physician (or your personal doctor if your instructions are to return to your personal doctor) about any new or lasting problems. The primary care or specialist physician will see patients referred from the [facility] Emergency Department.  Wing To